# Patient Record
Sex: MALE | Race: WHITE | NOT HISPANIC OR LATINO | URBAN - NONMETROPOLITAN AREA
[De-identification: names, ages, dates, MRNs, and addresses within clinical notes are randomized per-mention and may not be internally consistent; named-entity substitution may affect disease eponyms.]

---

## 2019-03-22 ENCOUNTER — IMPORTED ENCOUNTER (OUTPATIENT)
Dept: URBAN - NONMETROPOLITAN AREA CLINIC 1 | Facility: CLINIC | Age: 69
End: 2019-03-22

## 2019-03-22 PROBLEM — H25.13: Noted: 2017-02-20

## 2019-03-22 PROBLEM — H40.033: Noted: 2019-03-22

## 2019-03-22 PROBLEM — E11.3293: Noted: 2017-02-20

## 2019-03-22 PROCEDURE — 92014 COMPRE OPH EXAM EST PT 1/>: CPT

## 2019-03-22 NOTE — PATIENT DISCUSSION
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 -Stressed the importance of keeping blood sugars under control blood pressure under control and weight normalization and regular visits with PCP. -Explained the possible effects of poorly controlled diabetes and the damage that diabetes can cause to ocular health.-Letter sent to Dr. Juliana Kasper yet surgical. -Reviewed symptoms of advancing cataract growth such as glare and halos and decreased vision.-Continue to monitor for now. Pt will notify us if any new symptoms develop. Presbyopia-Discussed diagnosis with patient. Narrow Angle Anatomy-Gonio performed todayOD: steep insertion open to schwalbes LUDMILA  indentation opens to CBB angle is not occuldable at present timeOS: narrow approach steep insertion open to schwalbes LUDMILA although open to angle superiorly indentation opens to CBB-Explained narrow anatomy and what it can mean if it is left untreated. -Reviewed the signs and symptoms of a narrow angle attack.-Discussed RBAs of Laser Peripheral Iridotomy in the future but will continue to monitor for now.  RTC: 6mos F/u & Gonio (do not dilate); Dr's Notes: DR Ruth Soto IS CARDIOLOGIST.DFE 3/22/19Gonio 3/22/19

## 2019-09-27 ENCOUNTER — IMPORTED ENCOUNTER (OUTPATIENT)
Dept: URBAN - NONMETROPOLITAN AREA CLINIC 1 | Facility: CLINIC | Age: 69
End: 2019-09-27

## 2019-09-27 PROBLEM — H25.813: Noted: 2021-11-18

## 2019-09-27 PROBLEM — H52.4: Noted: 2019-09-27

## 2019-09-27 PROBLEM — E11.3293: Noted: 2020-06-30

## 2019-09-27 PROBLEM — H25.13: Noted: 2019-09-27

## 2019-09-27 PROBLEM — H40.033: Noted: 2019-09-27

## 2019-09-27 PROBLEM — H52.4: Noted: 2021-11-18

## 2019-09-27 PROBLEM — E11.3293: Noted: 2019-09-27

## 2019-09-27 PROCEDURE — 92012 INTRM OPH EXAM EST PATIENT: CPT

## 2019-09-27 NOTE — PATIENT DISCUSSION
Cataract OU-Reviewed symptoms of advancing cataract growth such as glare and halos and decreased vision.-Continue to monitor for now. Pt will notify us if any new symptoms develop. Presbyopia-Discussed diagnosis with patient. Narrow Angle Anatomy-Gonio performed today 9/27/2019OD: slightly narrow open to CBBOS: slightly narrow open to PTM/CBB-Explained narrow anatomy and what it can mean if it is left untreated. -Reviewed the signs and symptoms of a narrow angle attack.-Discussed RBAs of Laser Peripheral Iridotomy in the future but will continue to monitor for now. Iridotomy is not needed at this time (9/27/2019). 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 -Stressed the importance of keeping blood sugars under control blood pressure under control and weight normalization and regular visits with PCP. -Explained the possible effects of poorly controlled diabetes and the damage that diabetes can cause to ocular health.-Letter sent to Dr. Jessy Beckett; Dr's Notes: DR Nelli Diaz IS CARDIOLOGIST.DFE 3/22/19Gonio 3/22/19

## 2020-06-30 ENCOUNTER — IMPORTED ENCOUNTER (OUTPATIENT)
Dept: URBAN - NONMETROPOLITAN AREA CLINIC 1 | Facility: CLINIC | Age: 70
End: 2020-06-30

## 2020-06-30 PROBLEM — H25.813: Noted: 2021-11-18

## 2020-06-30 PROBLEM — E11.3293: Noted: 2020-06-30

## 2020-06-30 PROCEDURE — 92012 INTRM OPH EXAM EST PATIENT: CPT

## 2020-06-30 PROCEDURE — 92134 CPTRZ OPH DX IMG PST SGM RTA: CPT

## 2020-06-30 NOTE — PATIENT DISCUSSION
Cataract OU-Reviewed symptoms of advancing cataract growth such as glare and halos and decreased vision.-Continue to monitor for now. Pt will notify us if any new symptoms develop. Narrow Angle Anatomy-Explained narrow anatomy and what it can mean if it is left untreated. -Reviewed the signs and symptoms of a narrow angle attack.-gonio next visit-Continue to monitorDM c DR - Hx/o NPDR OU.-old MA's -HgA1C 6.?%-BS runs 100-140-OCT MAC performed and reviewd w/pt -Discussed with patient Diabetic Retinopathy and need for good Hemoglobin A1C control under 6.5-Stressed the importance of keeping blood sugars under control blood pressure under control and weight normalization and regular visits with PCP. -Explained the possible effects of poorly controlled diabetes and the damage that diabetes can cause to ocular health.; Dr's Notes: DR Fermin Chaves IS CARDIOLOGIST. from [place]

## 2021-11-18 ENCOUNTER — IMPORTED ENCOUNTER (OUTPATIENT)
Dept: URBAN - NONMETROPOLITAN AREA CLINIC 1 | Facility: CLINIC | Age: 71
End: 2021-11-18

## 2021-11-18 PROBLEM — H25.813: Noted: 2021-11-18

## 2021-11-18 PROBLEM — E11.3293: Noted: 2020-06-30

## 2021-11-18 PROCEDURE — 76514 ECHO EXAM OF EYE THICKNESS: CPT

## 2021-11-18 PROCEDURE — 92014 COMPRE OPH EXAM EST PT 1/>: CPT

## 2021-11-18 PROCEDURE — 92134 CPTRZ OPH DX IMG PST SGM RTA: CPT

## 2021-11-18 PROCEDURE — 92015 DETERMINE REFRACTIVE STATE: CPT

## 2021-11-18 NOTE — PATIENT DISCUSSION
Cataract(s)-Visually significant.-Cataract(s) causing symptomatic impairment of visual function not correctable with a tolerable change in glasses or contact lenses lighting or non-operative means resulting in specific activity limitations and/or participation restrictions including but not limited to reading viewing television driving or meeting vocational or recreational needs. -Expectation is clearer vision and reduced glare disability after cataract removal.-Refer to Dr Antoine Reasons for cataract evaluation pt defers at this time. Narrow Angle-Explained narrow anatomy and what it can mean if it is left untreated. -Reviewed the signs and symptoms of a narrow angle attack.-Continue to Makstr ordered and reviewed todayDM w/mild BDR -old MA's -HgA1C 6.?%-OCT MAC performed and reviewd w/pt-Stressed the importance of keeping blood sugars under control blood pressure under control and weight normalization and regular visits with PCP. -Explained the possible effects of poorly controlled diabetes and the damage that diabetes can cause to ocular health. Presbyopia-Rx issued for new glasses.; Dr's Notes: DR Shayne Rivera IS CARDIOLOGIST. from [place]

## 2022-04-10 ASSESSMENT — VISUAL ACUITY
OS_CC: 20/25
OD_CC: 20/30
OD_SC: 20/30-1
OD_CC: 20/30
OS_SC: 20/20-1
OS_CC: 20/30-
OD_CC: 20/30
OS_CC: 20/30
OD_PH: 20/25
OU_CC: J1

## 2022-04-10 ASSESSMENT — TONOMETRY
OS_IOP_MMHG: 19
OS_IOP_MMHG: 14
OD_IOP_MMHG: 16
OD_IOP_MMHG: 18
OS_IOP_MMHG: 14
OD_IOP_MMHG: 16
OD_IOP_MMHG: 19
OS_IOP_MMHG: 16

## 2022-11-18 ENCOUNTER — ESTABLISHED PATIENT (OUTPATIENT)
Dept: RURAL CLINIC 2 | Facility: CLINIC | Age: 72
End: 2022-11-18

## 2022-11-18 DIAGNOSIS — H52.03: ICD-10-CM

## 2022-11-18 DIAGNOSIS — H40.013: ICD-10-CM

## 2022-11-18 DIAGNOSIS — H52.4: ICD-10-CM

## 2022-11-18 DIAGNOSIS — E11.3293: ICD-10-CM

## 2022-11-18 DIAGNOSIS — H25.813: ICD-10-CM

## 2022-11-18 PROCEDURE — 92015 DETERMINE REFRACTIVE STATE: CPT

## 2022-11-18 PROCEDURE — 92134 CPTRZ OPH DX IMG PST SGM RTA: CPT

## 2022-11-18 PROCEDURE — 92014 COMPRE OPH EXAM EST PT 1/>: CPT

## 2022-11-18 ASSESSMENT — VISUAL ACUITY
OS_SC: 20/40
OD_SC: 20/40

## 2022-11-18 ASSESSMENT — TONOMETRY
OD_IOP_MMHG: 18
OS_IOP_MMHG: 15

## 2024-04-24 ENCOUNTER — ESTABLISHED PATIENT (OUTPATIENT)
Dept: RURAL CLINIC 2 | Facility: CLINIC | Age: 74
End: 2024-04-24

## 2024-04-24 DIAGNOSIS — E11.3293: ICD-10-CM

## 2024-04-24 DIAGNOSIS — H25.813: ICD-10-CM

## 2024-04-24 DIAGNOSIS — H40.013: ICD-10-CM

## 2024-04-24 PROCEDURE — 92014 COMPRE OPH EXAM EST PT 1/>: CPT

## 2024-04-24 PROCEDURE — 92134 CPTRZ OPH DX IMG PST SGM RTA: CPT

## 2024-04-24 ASSESSMENT — TONOMETRY
OD_IOP_MMHG: 17
OS_IOP_MMHG: 17

## 2024-04-24 ASSESSMENT — VISUAL ACUITY
OS_SC: 20/40
OD_SC: 20/40
OU_CC: 20/20
OU_SC: 20/25

## 2025-04-23 ENCOUNTER — COMPREHENSIVE EXAM (OUTPATIENT)
Age: 75
End: 2025-04-23

## 2025-04-23 DIAGNOSIS — H40.013: ICD-10-CM

## 2025-04-23 DIAGNOSIS — E11.3293: ICD-10-CM

## 2025-04-23 DIAGNOSIS — H25.813: ICD-10-CM

## 2025-04-23 PROCEDURE — 92134 CPTRZ OPH DX IMG PST SGM RTA: CPT

## 2025-04-23 PROCEDURE — 99214 OFFICE O/P EST MOD 30 MIN: CPT
